# Patient Record
Sex: MALE | Race: WHITE | URBAN - METROPOLITAN AREA
[De-identification: names, ages, dates, MRNs, and addresses within clinical notes are randomized per-mention and may not be internally consistent; named-entity substitution may affect disease eponyms.]

---

## 2021-02-23 ENCOUNTER — OFFICE VISIT (OUTPATIENT)
Dept: URBAN - METROPOLITAN AREA CLINIC 41 | Facility: CLINIC | Age: 76
End: 2021-02-23
Payer: MEDICARE

## 2021-02-23 DIAGNOSIS — H43.811 VITREOUS DEGENERATION, RIGHT EYE: ICD-10-CM

## 2021-02-23 PROCEDURE — 92134 CPTRZ OPH DX IMG PST SGM RTA: CPT | Performed by: OPHTHALMOLOGY

## 2021-02-23 PROCEDURE — 99212 OFFICE O/P EST SF 10 MIN: CPT | Performed by: OPHTHALMOLOGY

## 2021-02-23 ASSESSMENT — INTRAOCULAR PRESSURE
OD: 10
OS: 12

## 2021-02-23 NOTE — IMPRESSION/PLAN
Impression: h/o RD OS s/p PPV/EL/Gas 1/2/15 Plan: Retina fully attached, good foveal contour without ERM/CME on exam/OCT OS.   VA is excellent following h/o macula-off RD.

1 year, colors/OCT OU

## 2022-03-22 ENCOUNTER — OFFICE VISIT (OUTPATIENT)
Dept: URBAN - METROPOLITAN AREA CLINIC 41 | Facility: CLINIC | Age: 77
End: 2022-03-22
Payer: MEDICARE

## 2022-03-22 DIAGNOSIS — Z96.1 PRESENCE OF INTRAOCULAR LENS: ICD-10-CM

## 2022-03-22 DIAGNOSIS — E11.9 TYPE 2 DIABETES MELLITUS WITHOUT COMPLICATIONS: ICD-10-CM

## 2022-03-22 DIAGNOSIS — H33.8 OTHER RETINAL DETACHMENT: Primary | ICD-10-CM

## 2022-03-22 PROCEDURE — 92134 CPTRZ OPH DX IMG PST SGM RTA: CPT | Performed by: OPHTHALMOLOGY

## 2022-03-22 PROCEDURE — 99213 OFFICE O/P EST LOW 20 MIN: CPT | Performed by: OPHTHALMOLOGY

## 2022-03-22 ASSESSMENT — INTRAOCULAR PRESSURE
OS: 9
OD: 9

## 2022-03-22 NOTE — IMPRESSION/PLAN
Impression: h/o macula off RD OS s/p PPV/EL/Gas 1/2/15 Plan: Retina fully attached and stable. No ERM/CME on OCT OS. Photos taken for documentation. 

1 year, colors/OCT OU

## 2022-03-22 NOTE — IMPRESSION/PLAN
Impression: Type 2 diabetes mellitus without complications Plan: Exam/photos/OCT show no DR/DME OU. Cont BS/BP/chol control.